# Patient Record
Sex: FEMALE | URBAN - METROPOLITAN AREA
[De-identification: names, ages, dates, MRNs, and addresses within clinical notes are randomized per-mention and may not be internally consistent; named-entity substitution may affect disease eponyms.]

---

## 2020-10-21 ENCOUNTER — CARE COORDINATION (OUTPATIENT)
Dept: CARDIOLOGY | Facility: CLINIC | Age: 30
End: 2020-10-21

## 2020-10-21 NOTE — PROGRESS NOTES
Patient referred to see Dr Laguerre in October outreach clinic. Attempts to schedule were unsuccessful. Per Dr. Laguerre appts can be virtual.      Sanju Pete CMA  Heart Failure, Advanced Heart Failure & CORE  Referral Specialist &     [facility]  Cardiology  Office: [phone] [email or web address]

## 2020-10-26 PROBLEM — I25.10 CAD S/P PERCUTANEOUS CORONARY ANGIOPLASTY: Status: ACTIVE | Noted: 2020-10-07

## 2020-10-26 PROBLEM — Z98.61 CAD S/P PERCUTANEOUS CORONARY ANGIOPLASTY: Status: ACTIVE | Noted: 2020-10-07

## 2020-10-26 PROBLEM — I25.42 SPONTANEOUS DISSECTION OF CORONARY ARTERY: Status: ACTIVE | Noted: 2020-10-07

## 2020-10-26 PROBLEM — I50.22 CHRONIC SYSTOLIC CHF (CONGESTIVE HEART FAILURE) (H): Status: ACTIVE | Noted: 2020-10-07

## 2020-10-26 PROBLEM — I25.119 CORONARY ARTERY DISEASE INVOLVING NATIVE CORONARY ARTERY OF NATIVE HEART WITH ANGINA PECTORIS (H): Status: ACTIVE | Noted: 2020-10-07

## 2020-10-26 RX ORDER — FUROSEMIDE 80 MG
80 TABLET ORAL 2 TIMES DAILY
COMMUNITY
Start: 2020-09-04 | End: 2021-09-09

## 2020-10-26 RX ORDER — TRAZODONE HYDROCHLORIDE 50 MG/1
50 TABLET, FILM COATED ORAL AT BEDTIME
COMMUNITY

## 2020-10-26 RX ORDER — POTASSIUM CHLORIDE 750 MG/1
10 TABLET, EXTENDED RELEASE ORAL DAILY
COMMUNITY

## 2020-10-26 RX ORDER — CARVEDILOL 12.5 MG/1
12.5 TABLET ORAL 2 TIMES DAILY
COMMUNITY

## 2020-10-26 RX ORDER — ATORVASTATIN CALCIUM 80 MG/1
80 TABLET, FILM COATED ORAL DAILY
COMMUNITY

## 2020-10-26 RX ORDER — BUPROPION HYDROCHLORIDE 300 MG/1
300 TABLET ORAL DAILY
COMMUNITY

## 2020-10-26 NOTE — PROGRESS NOTES
Multiple attempts made to contact, all unsuccessful. Voicemail's have been left each time.     Last attempt made:  October 26, 2020      Sanju Pete CMA  Heart Failure, Advanced Heart Failure & CORE  Referral Specialist &     [facility]  Cardiology  Office: [phone]  [phone]

## 2020-11-05 NOTE — PROGRESS NOTES
Sent email to [facility] care team informing them of situation awaiting their response. Closing the referral encounter. If referred again will pursue appt.       Sanju Pete CMA  Heart Failure, Advanced Heart Failure & CORE  Referral Specialist &     [facility]  Cardiology  Office: [phone] [email or web address]

## 2022-06-30 ENCOUNTER — CARE COORDINATION (OUTPATIENT)
Dept: CARDIOLOGY | Facility: CLINIC | Age: 32
End: 2022-06-30

## 2022-06-30 NOTE — PROGRESS NOTES
Patient is being referred to [facility] outreach clinic to see either Dr. Laguerre or Dr. Mata.

## 2022-09-14 NOTE — PROGRESS NOTES
Writer has called patient many times has been unable to contact patient. Closing referral. Notifying [place] via email.

## 2022-10-21 ENCOUNTER — TELEPHONE (OUTPATIENT)
Dept: CARDIOLOGY | Facility: CLINIC | Age: 32
End: 2022-10-21

## 2022-10-21 NOTE — TELEPHONE ENCOUNTER
Per request from [place] will try to contact patient and get her scheduled in the [facility].

## 2023-01-26 NOTE — TELEPHONE ENCOUNTER
Many attempts have been made to contact patient unsuccessfully. Closing encounter. [facility] team has been notified.

## 2023-04-03 ENCOUNTER — CARE COORDINATION (OUTPATIENT)
Dept: CARDIOLOGY | Facility: CLINIC | Age: 33
End: 2023-04-03

## 2023-06-28 ENCOUNTER — TELEPHONE (OUTPATIENT)
Dept: CARDIOLOGY | Facility: CLINIC | Age: 33
End: 2023-06-28

## 2023-06-28 NOTE — TELEPHONE ENCOUNTER
Multiple attempts to reach [facility] to set up a visit with Dr. Mata were made. Unable to get a hold of patient. Referring clinic notified via email.     Attempts:  4/26/23 LVM  5/2/23 LVM  6/9/23 LVM   6/13/23 LVM   6/28/23 LVM